# Patient Record
Sex: FEMALE | Race: WHITE | NOT HISPANIC OR LATINO | Employment: OTHER | URBAN - METROPOLITAN AREA
[De-identification: names, ages, dates, MRNs, and addresses within clinical notes are randomized per-mention and may not be internally consistent; named-entity substitution may affect disease eponyms.]

---

## 2017-01-30 ENCOUNTER — GENERIC CONVERSION - ENCOUNTER (OUTPATIENT)
Dept: OTHER | Facility: OTHER | Age: 57
End: 2017-01-30

## 2017-02-07 ENCOUNTER — GENERIC CONVERSION - ENCOUNTER (OUTPATIENT)
Dept: OTHER | Facility: OTHER | Age: 57
End: 2017-02-07

## 2017-03-24 ENCOUNTER — ALLSCRIPTS OFFICE VISIT (OUTPATIENT)
Dept: OTHER | Facility: OTHER | Age: 57
End: 2017-03-24

## 2017-09-25 ENCOUNTER — ALLSCRIPTS OFFICE VISIT (OUTPATIENT)
Dept: OTHER | Facility: OTHER | Age: 57
End: 2017-09-25

## 2017-09-25 ENCOUNTER — LAB REQUISITION (OUTPATIENT)
Dept: LAB | Facility: HOSPITAL | Age: 57
End: 2017-09-25
Payer: COMMERCIAL

## 2017-09-25 DIAGNOSIS — Z01.419 ENCOUNTER FOR GYNECOLOGICAL EXAMINATION WITHOUT ABNORMAL FINDING: ICD-10-CM

## 2017-09-25 PROCEDURE — G0145 SCR C/V CYTO,THINLAYER,RESCR: HCPCS | Performed by: NURSE PRACTITIONER

## 2017-09-25 PROCEDURE — 87624 HPV HI-RISK TYP POOLED RSLT: CPT | Performed by: NURSE PRACTITIONER

## 2017-09-29 LAB — HPV RRNA GENITAL QL NAA+PROBE: NORMAL

## 2017-10-07 LAB
LAB AP GYN PRIMARY INTERPRETATION: NORMAL
Lab: NORMAL

## 2017-10-18 ENCOUNTER — GENERIC CONVERSION - ENCOUNTER (OUTPATIENT)
Dept: OTHER | Facility: OTHER | Age: 57
End: 2017-10-18

## 2018-01-13 VITALS
BODY MASS INDEX: 31.41 KG/M2 | WEIGHT: 177.25 LBS | DIASTOLIC BLOOD PRESSURE: 78 MMHG | SYSTOLIC BLOOD PRESSURE: 120 MMHG | HEIGHT: 63 IN

## 2018-01-14 NOTE — PROGRESS NOTES
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)   5  Osteopenia (733 90) (M85 80)   6  Osteoporosis (733 00) (M81 0)   7  Vitamin D deficiency (268 9) (E55 9)    Current Meds   1  Levothyroxine Sodium 25 MCG Oral Tablet; TABS PO X 30; Therapy: 70UAN3332 to (Last Rx:12Mar2011)  Requested for: 27Pxh4938 Ordered    Allergies    1   No Known Drug Allergies    Future Appointments    Date/Time Provider Specialty Site   09/29/2017 09:00 AM Advanced GYN, Nurse Schedule  [facility] [address] [place]     Signatures   Electronically signed by : Willem Rolle, ; Mar 24 2017 10:26AM EST                       (Author)    Electronically signed by : Annie Kessler; Mar 24 2017  1:41PM EST                       (Author)    Electronically signed by : Vandana Landrum DO; Mar 28 2017 11:05AM EST

## 2018-01-16 NOTE — RESULT NOTES
Verified Results  (1) THIN PREP PAP WITH IMAGING [id number] 89:92XL Demetrice Orta Order Number: [id number]     Test Name Result Flag Reference   LAB AP CASE REPORT (Report)     Gynecologic Cytology Report            Case: [id number]                  Authorizing Provider: ADELINA Sena    Collected:      09/25/2017 1446        First Screen:     MELISSA Hardy   Received:      09/27/2017 0800        Specimen:  LIQUID-BASED PAP, SCREENING, Cervix   HPV HIGH RISK RESULT (Report)     HPV, High Risk: HPV NEG, HPV16 NEG, HPV18 NEG      Other High Risk HPV Negative, HPV 16 Negative, HPV 18 Negative  HPV types: 16,18,31,33,35,39,45,51,52,56,58,59,66 and 68 DNA are undetectable or below the pre-set threshold  [employer] FDA approved Ronald 4800 is utilized with strict adherence to the manufacturers instruction  manual to test for the presence of High-Risk HPV DNA, as well as HPV 16 and HPV 18  This instrument  has been validated by our laboratory and/or by the   A negative result does not preclude the presence of HPV infection because results depend on adequate  specimen collection, absence of inhibitors and sufficient DNA to be detected  Additionally, HPV negative  results are not intended to prevent women from proceeding to colposcopy if clinically warranted  Positive HPV test results indicate the presence of any one or more of the high risk types, but since patients  are often co-infected with low-risk types it does not rule out the presence of low-risk types in patients  with mixed infections  LAB AP GYN PRIMARY INTERPRETATION      Negative for intraepithelial lesion or malignancy  Electronically signed by MELISSA Hardy on 10/7/2017 at 11:05 AM   LAB AP GYN SPECIMEN ADEQUACY      Satisfactory for evaluation  Endocervical/transformation zone component present     LAB AP GYN ADDITIONAL INFORMATION (Report)     [employer] FDA approved ,  and ThinPrep Imaging System are   utilized with strict adherence to the 's instruction manual to   prepare gynecologic and non-gynecologic cytology specimens for the   production of ThinPrep slides as well as for gynecologic ThinPrep imaging  These processes have been validated by our laboratory and/or by the     The Pap test is not a diagnostic procedure and should not be used as the   sole means to detect cervical cancer  It is only a screening procedure to   aid in the detection of cervical cancer and its precursors  Both   false-negative and false-positive results have been experienced  Your   patient's test result should be interpreted in this context together with   the history and clinical findings

## 2018-01-17 NOTE — PROGRESS NOTES
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)   5  Osteopenia (733 90) (M85 80)   6  Osteoporosis (733 00) (M81 0)   7  Vitamin D deficiency (268 9) (E55 9)    Current Meds   1  Levothyroxine Sodium 25 MCG Oral Tablet; TABS PO X 30; Therapy: 41QOL9277 to (Last Rx:12Mar2011)  Requested for: 79Kpg6663 Ordered   2  Raloxifene HCl - 60 MG Oral Tablet; TAKE 1 TABLET DAILY AS DIRECTED; Therapy: 48Icw4066 to (Evaluate:40Sjd3304)  Requested for: 72Afd4282; Last   Rx:[id number] Ordered   3  Raloxifene HCl - 60 MG Oral Tablet; TAKE 1 TABLET DAILY AS DIRECTED; Therapy: 33KTX7195 to (Evaluate:94Scr9977)  Requested for: [id number]; Last   Rx:75Tst0303 Ordered    Allergies    1   No Known Drug Allergies    Vitals  Signs    Systolic: 144  Diastolic: 78  Height: 5 ft 3 in  Weight: 168 lb   BMI Calculated: 29 76  BSA Calculated: 1 8    Plan  Osteoporosis    · Prolia 60 MG/ML Subcutaneous Solution    Signatures   Electronically signed by : Alvarez Victor, ; Sep  9 2016 11:25AM EST                       (Author)    Electronically signed by : Kush Pride; Sep  9 2016 12:09PM EST                       (Author)    Electronically signed by : Peter Lomas DO; Sep 10 2016 10:30AM EST

## 2018-01-31 DIAGNOSIS — M81.0 OSTEOPOROSIS, UNSPECIFIED OSTEOPOROSIS TYPE, UNSPECIFIED PATHOLOGICAL FRACTURE PRESENCE: Primary | ICD-10-CM

## 2018-03-28 ENCOUNTER — CLINICAL SUPPORT (OUTPATIENT)
Dept: GYNECOLOGY | Facility: CLINIC | Age: 58
End: 2018-03-28
Payer: COMMERCIAL

## 2018-03-28 VITALS
WEIGHT: 177 LBS | HEIGHT: 63 IN | BODY MASS INDEX: 31.36 KG/M2 | DIASTOLIC BLOOD PRESSURE: 78 MMHG | SYSTOLIC BLOOD PRESSURE: 120 MMHG

## 2018-03-28 DIAGNOSIS — M81.8 OTHER OSTEOPOROSIS WITHOUT CURRENT PATHOLOGICAL FRACTURE: Primary | ICD-10-CM

## 2018-03-28 PROCEDURE — 96372 THER/PROPH/DIAG INJ SC/IM: CPT

## 2018-09-13 DIAGNOSIS — M81.0 OSTEOPOROSIS, UNSPECIFIED OSTEOPOROSIS TYPE, UNSPECIFIED PATHOLOGICAL FRACTURE PRESENCE: ICD-10-CM

## 2018-09-18 DIAGNOSIS — M81.0 OSTEOPOROSIS, UNSPECIFIED OSTEOPOROSIS TYPE, UNSPECIFIED PATHOLOGICAL FRACTURE PRESENCE: ICD-10-CM

## 2018-10-02 DIAGNOSIS — M81.0 OSTEOPOROSIS, UNSPECIFIED OSTEOPOROSIS TYPE, UNSPECIFIED PATHOLOGICAL FRACTURE PRESENCE: ICD-10-CM

## 2018-10-03 ENCOUNTER — ANNUAL EXAM (OUTPATIENT)
Dept: GYNECOLOGY | Facility: CLINIC | Age: 58
End: 2018-10-03
Payer: COMMERCIAL

## 2018-10-03 VITALS
BODY MASS INDEX: 33.09 KG/M2 | SYSTOLIC BLOOD PRESSURE: 150 MMHG | DIASTOLIC BLOOD PRESSURE: 110 MMHG | WEIGHT: 179.8 LBS | HEIGHT: 62 IN

## 2018-10-03 DIAGNOSIS — Z01.419 ENCOUNTER FOR ANNUAL ROUTINE GYNECOLOGICAL EXAMINATION: Primary | ICD-10-CM

## 2018-10-03 DIAGNOSIS — Z12.31 ENCOUNTER FOR SCREENING MAMMOGRAM FOR MALIGNANT NEOPLASM OF BREAST: ICD-10-CM

## 2018-10-03 DIAGNOSIS — M81.0 OSTEOPOROSIS, UNSPECIFIED OSTEOPOROSIS TYPE, UNSPECIFIED PATHOLOGICAL FRACTURE PRESENCE: ICD-10-CM

## 2018-10-03 DIAGNOSIS — M85.80 OSTEOPENIA, UNSPECIFIED LOCATION: ICD-10-CM

## 2018-10-03 PROCEDURE — S0612 ANNUAL GYNECOLOGICAL EXAMINA: HCPCS | Performed by: NURSE PRACTITIONER

## 2018-10-03 PROCEDURE — 96372 THER/PROPH/DIAG INJ SC/IM: CPT | Performed by: NURSE PRACTITIONER

## 2018-10-03 RX ORDER — CYANOCOBALAMIN (VITAMIN B-12) 500 MCG
LOZENGE ORAL
COMMUNITY
Start: 2013-09-04 | End: 2021-03-08 | Stop reason: ALTCHOICE

## 2018-10-03 RX ORDER — ASCORBATE CALCIUM 500 MG
1000 TABLET ORAL
COMMUNITY
Start: 2012-05-30

## 2018-10-03 RX ORDER — LEVOTHYROXINE SODIUM 0.03 MG/1
TABLET ORAL
COMMUNITY
Start: 2018-09-07

## 2018-10-03 NOTE — PROGRESS NOTES
Pt  tolerated injection well given in right arm    Gordon 47 [id number]  LOT [id number]  EXP 11/2020  INJECTION SHIPPED HERE

## 2018-10-10 DIAGNOSIS — M81.0 OSTEOPOROSIS, UNSPECIFIED OSTEOPOROSIS TYPE, UNSPECIFIED PATHOLOGICAL FRACTURE PRESENCE: ICD-10-CM

## 2019-02-07 ENCOUNTER — TELEPHONE (OUTPATIENT)
Dept: GYNECOLOGY | Facility: CLINIC | Age: 59
End: 2019-02-07

## 2019-02-07 DIAGNOSIS — Z78.0 MENOPAUSE: Primary | ICD-10-CM

## 2019-02-07 DIAGNOSIS — M89.9 BONE DISORDER: ICD-10-CM

## 2019-03-01 ENCOUNTER — DOCUMENTATION (OUTPATIENT)
Dept: GYNECOLOGY | Facility: CLINIC | Age: 59
End: 2019-03-01

## 2019-03-01 DIAGNOSIS — M81.0 OSTEOPOROSIS, UNSPECIFIED OSTEOPOROSIS TYPE, UNSPECIFIED PATHOLOGICAL FRACTURE PRESENCE: ICD-10-CM

## 2019-03-01 NOTE — PROGRESS NOTES
Results of Dexa have been reviewed by Dr Lizbeth Chaves much improved no robert for Prolia left message on pt cell phone to call back appt was cancelled for 4/4 2019

## 2019-03-14 DIAGNOSIS — Z78.0 MENOPAUSE: ICD-10-CM

## 2019-03-14 DIAGNOSIS — M89.9 BONE DISORDER: ICD-10-CM

## 2019-10-10 NOTE — PROGRESS NOTES
Assessment/Plan:    Discussed vaginal atrophy and the risks and benefits of starting vaginal estrogen cream   Patient has agreed to start and will use 1/2 gm 2x per week  Recommended monthly SBE, annual CBE and annual screening mammo  ASCCP guidelines reviewed and pap with cotesting noted to be up to date; this low risk patient was advised she meets criteria to d/c pap screening at age 73  DEXA and colonoscopy noted to be up to date  The patient denies STI risk factors and declines testing at this time  Reviewed diet/activity recommendations Calcium 2083-2291 mg and Vit D 600-1000 IU daily  Discussed postmenopausal considerations and symptoms to report  Kegel exercises as instructed  RTO in one year for routine annual gyn exam or sooner PRN  Diagnoses and all orders for this visit:    Encounter for gynecological examination (general) (routine) without abnormal findings    Vaginal atrophy  -     estradiol (ESTRACE) 0 1 mg/g vaginal cream; Insert 0 5 g into the vagina 2 (two) times a week        Subjective:      Patient ID: Kandi Valencia is a 63 y o  female  This patient presents for routine annual gyn exam  S/P LSH  BSO for fibroids  Has vaginal dryness and decreased libido  , monogamous 33 years  She denies  bleeding or spotting, VM sx, pelvic pain, breast concerns, abnormal discharge, bowel/bladder dysfunction  Denies STI concerns  No hx of STIs  Pap/HPV up to date and normal, 9/25/17, due 2020  Mammography up to date and normal, 2/22/19  Osteoporosis screening up to date 3/2019, Prolia was stopped secondary to improvement in DXA  Colonoscopy up to date and normal done in 2018, due in 5 years  Patient has had negative genetic testing done  She is a therapist in private practice           The following portions of the patient's history were reviewed and updated as appropriate: allergies, current medications, past family history, past medical history, past social history, past surgical history and problem list     Review of Systems   Constitutional: Negative  Respiratory: Negative  Cardiovascular: Negative  Gastrointestinal: Negative  Endocrine: Negative  Genitourinary: Negative for dyspareunia, dysuria, frequency, pelvic pain, urgency, vaginal bleeding, vaginal discharge and vaginal pain  Musculoskeletal: Negative  Skin: Negative  Neurological: Negative  Psychiatric/Behavioral: Negative  Objective:      /82   Pulse (!) 123   Ht 5' 3" (1 6 m)   Wt 84 4 kg (186 lb)   BMI 32 95 kg/m²          Physical Exam   Constitutional: She is oriented to person, place, and time  She appears well-developed and well-nourished  She is cooperative  HENT:   Head: Normocephalic and atraumatic  Neck: Normal range of motion  Neck supple  No thyroid mass and no thyromegaly present  Cardiovascular: Normal rate, regular rhythm and normal heart sounds  Pulmonary/Chest: Effort normal and breath sounds normal  Right breast exhibits no inverted nipple, no mass, no nipple discharge, no skin change and no tenderness  Left breast exhibits no inverted nipple, no mass, no nipple discharge, no skin change and no tenderness  No breast tenderness or discharge  Breasts are symmetrical    Abdominal: Soft  Normal appearance and bowel sounds are normal  There is no hepatosplenomegaly  There is no tenderness  Hernia confirmed negative in the right inguinal area and confirmed negative in the left inguinal area  Genitourinary: Vagina normal  Rectal exam shows no external hemorrhoid  No breast tenderness or discharge  Pelvic exam was performed with patient supine  No labial fusion  There is no rash, tenderness, lesion or injury on the right labia  There is no rash, tenderness, lesion or injury on the left labia  Cervix exhibits no motion tenderness, no discharge and no friability  Right adnexum displays no mass, no tenderness and no fullness   Left adnexum displays no mass, no tenderness and no fullness  No erythema, tenderness or bleeding in the vagina  No signs of injury around the vagina  No vaginal discharge found  Genitourinary Comments: Urethra normal without lesions  No bladder tenderness  Moderate VVA  Uterus absent, no masses or tenderness on BME   Musculoskeletal: Normal range of motion  Lymphadenopathy:     She has no axillary adenopathy  No inguinal adenopathy noted on the right or left side  Right: No inguinal adenopathy present  Left: No inguinal adenopathy present  Neurological: She is alert and oriented to person, place, and time  Skin: Skin is warm, dry and intact  Psychiatric: She has a normal mood and affect  Her speech is normal and behavior is normal  Cognition and memory are normal    Nursing note and vitals reviewed

## 2019-10-11 ENCOUNTER — ANNUAL EXAM (OUTPATIENT)
Dept: GYNECOLOGY | Facility: CLINIC | Age: 59
End: 2019-10-11
Payer: COMMERCIAL

## 2019-10-11 VITALS
BODY MASS INDEX: 32.96 KG/M2 | SYSTOLIC BLOOD PRESSURE: 124 MMHG | WEIGHT: 186 LBS | DIASTOLIC BLOOD PRESSURE: 82 MMHG | HEIGHT: 63 IN | HEART RATE: 123 BPM

## 2019-10-11 DIAGNOSIS — Z01.419 ENCOUNTER FOR GYNECOLOGICAL EXAMINATION (GENERAL) (ROUTINE) WITHOUT ABNORMAL FINDINGS: Primary | ICD-10-CM

## 2019-10-11 DIAGNOSIS — N95.2 VAGINAL ATROPHY: ICD-10-CM

## 2019-10-11 PROCEDURE — S0612 ANNUAL GYNECOLOGICAL EXAMINA: HCPCS | Performed by: OBSTETRICS & GYNECOLOGY

## 2019-10-11 RX ORDER — ESTRADIOL 0.1 MG/G
0.5 CREAM VAGINAL 2 TIMES WEEKLY
Qty: 42.5 G | Refills: 14 | Status: SHIPPED | OUTPATIENT
Start: 2019-10-14 | End: 2021-03-08 | Stop reason: ALTCHOICE

## 2019-10-11 NOTE — PATIENT INSTRUCTIONS
Discussed vaginal atrophy and the risks and benefits of starting vaginal estrogen cream   Patient has agreed to start and will use 1/2 gm 2x per week  Recommended monthly SBE, annual CBE and annual screening mammo  ASCCP guidelines reviewed and pap with cotesting noted to be up to date; this low risk patient was advised she meets criteria to d/c pap screening at age 73  DEXA and colonoscopy noted to be up to date  The patient denies STI risk factors and declines testing at this time  Reviewed diet/activity recommendations Calcium 6846-2248 mg and Vit D 600-1000 IU daily  Discussed postmenopausal considerations and symptoms to report  Kegel exercises as instructed  RTO in one year for routine annual gyn exam or sooner PRN

## 2021-03-08 ENCOUNTER — ANNUAL EXAM (OUTPATIENT)
Dept: GYNECOLOGY | Facility: CLINIC | Age: 61
End: 2021-03-08
Payer: COMMERCIAL

## 2021-03-08 VITALS
HEART RATE: 94 BPM | BODY MASS INDEX: 32.43 KG/M2 | HEIGHT: 63 IN | SYSTOLIC BLOOD PRESSURE: 120 MMHG | WEIGHT: 183 LBS | DIASTOLIC BLOOD PRESSURE: 74 MMHG

## 2021-03-08 DIAGNOSIS — Z90.711 S/P LAPAROSCOPIC SUPRACERVICAL HYSTERECTOMY: ICD-10-CM

## 2021-03-08 DIAGNOSIS — Z01.419 ENCOUNTER FOR GYNECOLOGICAL EXAMINATION WITH PAPANICOLAOU SMEAR OF CERVIX: ICD-10-CM

## 2021-03-08 DIAGNOSIS — Z01.419 ENCOUNTER FOR GYNECOLOGICAL EXAMINATION (GENERAL) (ROUTINE) WITHOUT ABNORMAL FINDINGS: Primary | ICD-10-CM

## 2021-03-08 DIAGNOSIS — N95.2 VAGINAL ATROPHY: ICD-10-CM

## 2021-03-08 DIAGNOSIS — Z01.419 ENCOUNTER FOR GYNECOLOGICAL EXAMINATION WITH PAPANICOLAOU SMEAR OF CERVIX: Primary | ICD-10-CM

## 2021-03-08 DIAGNOSIS — Z87.39 HISTORY OF OSTEOPOROSIS: ICD-10-CM

## 2021-03-08 PROCEDURE — 99396 PREV VISIT EST AGE 40-64: CPT | Performed by: OBSTETRICS & GYNECOLOGY

## 2021-03-08 RX ORDER — ESTRADIOL 0.1 MG/G
0.5 CREAM VAGINAL 2 TIMES WEEKLY
Qty: 42.5 G | Refills: 1 | Status: SHIPPED | OUTPATIENT
Start: 2021-03-08 | End: 2021-03-08

## 2021-03-08 RX ORDER — ESTRADIOL 0.1 MG/G
0.5 CREAM VAGINAL 2 TIMES WEEKLY
Qty: 42.5 G | Refills: 1 | Status: SHIPPED | OUTPATIENT
Start: 2021-03-08

## 2021-03-10 LAB
CYTOLOGIST CVX/VAG CYTO: NORMAL
DX ICD CODE: NORMAL
OTHER STN SPEC: NORMAL
PATH REPORT.FINAL DX SPEC: NORMAL
SL AMB NOTE:: NORMAL
SL AMB SPECIMEN ADEQUACY: NORMAL
SL AMB TEST METHODOLOGY: NORMAL

## 2021-03-30 ENCOUNTER — TELEPHONE (OUTPATIENT)
Dept: GYNECOLOGY | Facility: CLINIC | Age: 61
End: 2021-03-30

## 2021-03-30 NOTE — TELEPHONE ENCOUNTER
----- Message from [id number]  [facility] sent at 3/30/2021 12:13 PM EDT -----  This is still not resulted  Can you double check     [facility] and also sent the paper back verifying the add on of HPV on 03/30/21 as well as initially speaking with them

## 2021-04-07 LAB — HPV I/H RISK 4 DNA CVX QL PROBE+SIG AMP: NEGATIVE

## 2021-08-27 ENCOUNTER — TELEPHONE (OUTPATIENT)
Dept: GYNECOLOGY | Facility: CLINIC | Age: 61
End: 2021-08-27

## 2021-08-27 NOTE — TELEPHONE ENCOUNTER
Ellyn called with Code # [id number] so that you can retreive her Dexa from [facility]  Not sure if you know how

## 2021-08-30 NOTE — TELEPHONE ENCOUNTER
I do not  The results are not on care everywhere  Please call them and have results faxed to our office

## 2021-09-13 ENCOUNTER — TELEPHONE (OUTPATIENT)
Dept: GYNECOLOGY | Facility: CLINIC | Age: 61
End: 2021-09-13

## 2021-09-13 NOTE — TELEPHONE ENCOUNTER
Patient made aware of osteoporosis on DXA and will restart Prolia  Message sent to pool to check ins coverage and call patient with results

## 2021-10-26 ENCOUNTER — DOCUMENTATION (OUTPATIENT)
Dept: GYNECOLOGY | Facility: CLINIC | Age: 61
End: 2021-10-26

## 2021-10-27 ENCOUNTER — DOCUMENTATION (OUTPATIENT)
Dept: GYNECOLOGY | Facility: CLINIC | Age: 61
End: 2021-10-27

## 2021-10-27 ENCOUNTER — TELEPHONE (OUTPATIENT)
Dept: GYNECOLOGY | Facility: CLINIC | Age: 61
End: 2021-10-27

## 2021-11-23 ENCOUNTER — CLINICAL SUPPORT (OUTPATIENT)
Dept: GYNECOLOGY | Facility: CLINIC | Age: 61
End: 2021-11-23
Payer: COMMERCIAL

## 2021-11-23 DIAGNOSIS — M81.0 OSTEOPOROSIS, UNSPECIFIED OSTEOPOROSIS TYPE, UNSPECIFIED PATHOLOGICAL FRACTURE PRESENCE: Primary | ICD-10-CM

## 2021-11-23 PROCEDURE — 96372 THER/PROPH/DIAG INJ SC/IM: CPT | Performed by: OBSTETRICS & GYNECOLOGY

## 2022-03-16 NOTE — PROGRESS NOTES
Assessment/Plan:    Will continue follow up with breast care team  ASCCP guidelines reviewed and pap with cotesting noted to be up to date; this low risk patient was advised she meets criteria to d/c pap screening at age 73  No pap done today  DEXA and colonoscopy noted to be up to date  Reviewed diet/activity recommendations Calcium 7012-2133 mg and Vit D 600-1000 IU daily  Discussed postmenopausal considerations and symptoms to report  Kegel exercises as instructed  RTO in one year for routine annual gyn exam or sooner PRN  Diagnoses and all orders for this visit:    Encounter for gynecological examination (general) (routine) without abnormal findings        Subjective:      Patient ID: Miguel Ángel Muñoz is a 62 y o  female  This patient presents for routine annual gyn exam   Has a hx of LSH/BSO for fibroids  She follows with breast specialist at [facility] for hx of intraductal papilloma of right breast  Benign breast MRI 3/3/22  Her family history is positive for breast cancer in her mother who was diagnosed at the age of 48, then she was diagnosed with endometrial cancer at age 64, colon cancer at age 62 and lymphoma between the ages of 64-70  Per the patient her mother tested negative for the BRCA and colon mutations  Two maternal uncles along with both of her grandfathers had prostate cancer  The patient's [employer] testing is negative  She is on Prolia for osteoporosis  She denies vaginal bleeding or spotting, VM sx, pelvic pain, dyspareunia, breast concerns, abnormal discharge, bowel/bladder dysfunction, depression/anx   35 years, rarely sexually active as her  now has back issues  Pap/HPV up to date and normal, 3/8/21  Osteoporosis screening up to date, 8/23/21, osteoporosis  Colonoscopy up to date, done in 2018 with Martha Norton        The following portions of the patient's history were reviewed and updated as appropriate: allergies, current medications, past family history, past medical history, past social history, past surgical history and problem list     Review of Systems   Constitutional: Negative  Respiratory: Negative  Cardiovascular: Negative  Gastrointestinal: Negative  Endocrine: Negative  Genitourinary: Negative for dyspareunia, dysuria, frequency, pelvic pain, urgency, vaginal bleeding, vaginal discharge and vaginal pain  Musculoskeletal: Negative  Skin: Negative  Neurological: Negative  Psychiatric/Behavioral: Negative  Objective:      /72   Ht 5' 2" (1 575 m)   Wt 85 3 kg (188 lb)   BMI 34 39 kg/m²          Physical Exam  Vitals and nursing note reviewed  Exam conducted with a chaperone present  Constitutional:       Appearance: Normal appearance  She is well-developed  HENT:      Head: Normocephalic and atraumatic  Neck:      Thyroid: No thyroid mass or thyromegaly  Cardiovascular:      Rate and Rhythm: Normal rate and regular rhythm  Heart sounds: Normal heart sounds  Pulmonary:      Effort: Pulmonary effort is normal       Breath sounds: Normal breath sounds  Chest:   Breasts: Breasts are symmetrical       Right: No inverted nipple, mass, nipple discharge, skin change or tenderness  Left: No inverted nipple, mass, nipple discharge, skin change or tenderness  Abdominal:      General: Bowel sounds are normal       Palpations: Abdomen is soft  Tenderness: There is no abdominal tenderness  Hernia: There is no hernia in the left inguinal area or right inguinal area  Genitourinary:     General: Normal vulva  Exam position: Supine  Pubic Area: No rash  Labia:         Right: No rash, tenderness, lesion or injury  Left: No rash, tenderness, lesion or injury  Urethra: No prolapse, urethral pain, urethral swelling or urethral lesion  Vagina: Normal  No signs of injury and foreign body   No vaginal discharge, erythema, tenderness, bleeding, lesions or prolapsed vaginal walls  Cervix: No cervical motion tenderness, discharge, friability, lesion, erythema, cervical bleeding or eversion  Adnexa:         Right: No mass, tenderness or fullness  Left: No mass, tenderness or fullness  Rectum: No external hemorrhoid  Comments: Urethra normal without lesions  No bladder tenderness  Uterus absent, no masses or tenderness on BME, exam limited by body habitus  Musculoskeletal:         General: Normal range of motion  Cervical back: Normal range of motion and neck supple  Lymphadenopathy:      Lower Body: No right inguinal adenopathy  No left inguinal adenopathy  Skin:     General: Skin is warm and dry  Neurological:      Mental Status: She is alert and oriented to person, place, and time  Psychiatric:         Speech: Speech normal          Behavior: Behavior normal  Behavior is cooperative

## 2022-03-17 ENCOUNTER — ANNUAL EXAM (OUTPATIENT)
Dept: GYNECOLOGY | Facility: CLINIC | Age: 62
End: 2022-03-17
Payer: COMMERCIAL

## 2022-03-17 VITALS
DIASTOLIC BLOOD PRESSURE: 72 MMHG | SYSTOLIC BLOOD PRESSURE: 152 MMHG | BODY MASS INDEX: 34.6 KG/M2 | HEIGHT: 62 IN | WEIGHT: 188 LBS

## 2022-03-17 DIAGNOSIS — Z01.419 ENCOUNTER FOR GYNECOLOGICAL EXAMINATION (GENERAL) (ROUTINE) WITHOUT ABNORMAL FINDINGS: Primary | ICD-10-CM

## 2022-03-17 PROCEDURE — 99396 PREV VISIT EST AGE 40-64: CPT | Performed by: OBSTETRICS & GYNECOLOGY

## 2022-05-24 ENCOUNTER — OFFICE VISIT (OUTPATIENT)
Dept: GYNECOLOGY | Facility: CLINIC | Age: 62
End: 2022-05-24
Payer: COMMERCIAL

## 2022-05-24 VITALS
SYSTOLIC BLOOD PRESSURE: 140 MMHG | WEIGHT: 189 LBS | HEART RATE: 94 BPM | DIASTOLIC BLOOD PRESSURE: 92 MMHG | HEIGHT: 62 IN | BODY MASS INDEX: 34.78 KG/M2

## 2022-05-24 DIAGNOSIS — M81.0 OSTEOPOROSIS, UNSPECIFIED OSTEOPOROSIS TYPE, UNSPECIFIED PATHOLOGICAL FRACTURE PRESENCE: Primary | ICD-10-CM

## 2022-05-24 PROCEDURE — 96372 THER/PROPH/DIAG INJ SC/IM: CPT

## 2022-05-24 NOTE — PROGRESS NOTES
[facility] site, lot  [id number], exp  05/2024,   Gordon  [phone], Bill both admin and medication

## 2022-11-10 ENCOUNTER — DOCUMENTATION (OUTPATIENT)
Dept: GYNECOLOGY | Facility: CLINIC | Age: 62
End: 2022-11-10

## 2022-11-29 ENCOUNTER — CLINICAL SUPPORT (OUTPATIENT)
Dept: GYNECOLOGY | Facility: CLINIC | Age: 62
End: 2022-11-29

## 2022-11-29 DIAGNOSIS — M81.0 OSTEOPOROSIS, UNSPECIFIED OSTEOPOROSIS TYPE, UNSPECIFIED PATHOLOGICAL FRACTURE PRESENCE: Primary | ICD-10-CM

## 2022-11-29 NOTE — PROGRESS NOTES
Pt  Tolerated injection well give in the left upper arm  NDC 66933-216-58  LOT [id number]  EXP 3/31/2024  Bill for both

## 2023-05-24 NOTE — PROGRESS NOTES
Assessment/Plan:    Will continue with breast care team   ASCCP guidelines reviewed and pap with cotesting noted to be up to date; this low risk patient was advised she meets criteria to d/c pap screening at age 73  DEXA script given and colonoscopy noted to be up to date  Reviewed diet/activity recommendations Calcium 1200 mg and Vit D 600-1000 IU daily  Discussed postmenopausal considerations and symptoms to report  Kegel exercises as instructed  RTO in one year for routine annual gyn exam or sooner PRN  Diagnoses and all orders for this visit:    Encounter for gynecological examination (general) (routine) without abnormal findings    Menopause  -     DXA bone density spine hip and pelvis; Future        Subjective:      Patient ID: Nathen Tavarez is a 59 y o  female  This patient presents for routine annual gyn exam   Has a hx of LSH/BSO for fibroids  She follows with breast specialist at [facility] for hx of intraductal papilloma of right breast  Benign breast MRI 3/3/22  Mammo benign 8/31/22  Her family history is positive for breast cancer in her mother who was diagnosed at the age of 48, then she was diagnosed with endometrial cancer at age 64, colon cancer at age 62 and lymphoma between the ages of 64-70  Per the patient her mother tested negative for the BRCA and colon mutations  Two maternal uncles along with both of her grandfathers had prostate cancer  The patient's [employer] testing is negative  She is on Prolia for osteoporosis  Prolia given today  She denies vaginal bleeding or spotting, VM sx, pelvic pain, dyspareunia, breast concerns, abnormal discharge, bowel/bladder dysfunction, depression/anx   37 years, rarely sexually active as her  now has back issues  Pap/HPV up to date and normal, 3/8/21  Osteoporosis screening up to date, 8/23/21, osteoporosis  Colonoscopy up to date, done in 2018 with Jacey Edwards        The following portions of the patient's history were reviewed "and updated as appropriate: allergies, current medications, past family history, past medical history, past social history, past surgical history and problem list     Review of Systems   Constitutional: Negative  Respiratory: Negative  Cardiovascular: Negative  Gastrointestinal: Negative  Endocrine: Negative  Genitourinary: Negative for dyspareunia, dysuria, frequency, pelvic pain, urgency, vaginal bleeding, vaginal discharge and vaginal pain  Musculoskeletal: Negative  Skin: Negative  Neurological: Negative  Psychiatric/Behavioral: Negative  Objective:      /70   Pulse 100   Ht 5' 2\" (1 575 m)   Wt 84 8 kg (187 lb)   BMI 34 20 kg/m²          Physical Exam  Vitals and nursing note reviewed  Exam conducted with a chaperone present  Constitutional:       Appearance: Normal appearance  She is well-developed  HENT:      Head: Normocephalic and atraumatic  Neck:      Thyroid: No thyroid mass or thyromegaly  Cardiovascular:      Rate and Rhythm: Normal rate and regular rhythm  Heart sounds: Normal heart sounds  Pulmonary:      Effort: Pulmonary effort is normal       Breath sounds: Normal breath sounds  Chest:   Breasts:     Breasts are symmetrical       Right: No inverted nipple, mass, nipple discharge, skin change or tenderness  Left: No inverted nipple, mass, nipple discharge, skin change or tenderness  Abdominal:      General: Bowel sounds are normal       Palpations: Abdomen is soft  Tenderness: There is no abdominal tenderness  Hernia: There is no hernia in the left inguinal area or right inguinal area  Genitourinary:     General: Normal vulva  Exam position: Supine  Pubic Area: No rash  Labia:         Right: No rash, tenderness, lesion or injury  Left: No rash, tenderness, lesion or injury  Urethra: No prolapse, urethral pain, urethral swelling or urethral lesion        Vagina: Normal  No signs of injury " and foreign body  No vaginal discharge, erythema, tenderness, bleeding, lesions or prolapsed vaginal walls  Cervix: No cervical motion tenderness, discharge, friability, lesion, erythema, cervical bleeding or eversion  Adnexa:         Right: No mass, tenderness or fullness  Left: No mass, tenderness or fullness  Rectum: No external hemorrhoid  Comments: Urethra normal without lesions  No bladder tenderness  Uterus absent, no masses or tenderness on BME, exam limited by body habitus  Musculoskeletal:         General: Normal range of motion  Cervical back: Normal range of motion and neck supple  Lymphadenopathy:      Lower Body: No right inguinal adenopathy  No left inguinal adenopathy  Skin:     General: Skin is warm and dry  Neurological:      Mental Status: She is alert and oriented to person, place, and time  Psychiatric:         Speech: Speech normal          Behavior: Behavior normal  Behavior is cooperative

## 2023-05-30 ENCOUNTER — ANNUAL EXAM (OUTPATIENT)
Dept: GYNECOLOGY | Facility: CLINIC | Age: 63
End: 2023-05-30

## 2023-05-30 VITALS
BODY MASS INDEX: 34.41 KG/M2 | HEIGHT: 62 IN | DIASTOLIC BLOOD PRESSURE: 70 MMHG | WEIGHT: 187 LBS | SYSTOLIC BLOOD PRESSURE: 124 MMHG | HEART RATE: 100 BPM

## 2023-05-30 DIAGNOSIS — M81.0 OSTEOPOROSIS, UNSPECIFIED OSTEOPOROSIS TYPE, UNSPECIFIED PATHOLOGICAL FRACTURE PRESENCE: ICD-10-CM

## 2023-05-30 DIAGNOSIS — Z01.419 ENCOUNTER FOR GYNECOLOGICAL EXAMINATION (GENERAL) (ROUTINE) WITHOUT ABNORMAL FINDINGS: Primary | ICD-10-CM

## 2023-05-30 DIAGNOSIS — Z78.0 MENOPAUSE: ICD-10-CM

## 2023-06-28 ENCOUNTER — TELEPHONE (OUTPATIENT)
Dept: GYNECOLOGY | Facility: CLINIC | Age: 63
End: 2023-06-28

## 2023-06-28 NOTE — TELEPHONE ENCOUNTER
Patient called and states she is wondering if there is a card which covers part of prolia cost?  She states it is a discount card  Please let me know or contact patient    Can leave message

## 2023-08-23 ENCOUNTER — TELEPHONE (OUTPATIENT)
Dept: GYNECOLOGY | Facility: CLINIC | Age: 63
End: 2023-08-23

## 2023-11-16 ENCOUNTER — TELEPHONE (OUTPATIENT)
Dept: GYNECOLOGY | Facility: CLINIC | Age: 63
End: 2023-11-16

## 2023-11-30 ENCOUNTER — OFFICE VISIT (OUTPATIENT)
Dept: GYNECOLOGY | Facility: CLINIC | Age: 63
End: 2023-11-30
Payer: COMMERCIAL

## 2023-11-30 VITALS
DIASTOLIC BLOOD PRESSURE: 70 MMHG | SYSTOLIC BLOOD PRESSURE: 118 MMHG | BODY MASS INDEX: 34.41 KG/M2 | WEIGHT: 187 LBS | HEIGHT: 62 IN

## 2023-11-30 DIAGNOSIS — M81.0 AGE-RELATED OSTEOPOROSIS WITHOUT CURRENT PATHOLOGICAL FRACTURE: Primary | ICD-10-CM

## 2023-11-30 PROCEDURE — 96372 THER/PROPH/DIAG INJ SC/IM: CPT

## 2023-11-30 NOTE — PROGRESS NOTES
pt present for prolia injection. Tolerated well in right upper arm. Office supplied, [facility] Both.  Given by Merline Ohara  [address] [id number]  lot [id number]  exp 08/31/2025

## 2024-01-19 ENCOUNTER — DOCUMENTATION (OUTPATIENT)
Dept: OBGYN CLINIC | Facility: CLINIC | Age: 64
End: 2024-01-19

## 2024-02-09 ENCOUNTER — DOCUMENTATION (OUTPATIENT)
Dept: OBGYN CLINIC | Facility: CLINIC | Age: 64
End: 2024-02-09

## 2024-02-09 NOTE — PROGRESS NOTES
Prolia not covered under her pharmacy plan   will try to see if it is covered  under her medical plan .  Send via SeeOnt   It may be excempt from her plan all together may  have to use copay card.

## 2024-02-21 PROBLEM — Z01.419 ENCOUNTER FOR ANNUAL ROUTINE GYNECOLOGICAL EXAMINATION: Status: RESOLVED | Noted: 2018-10-03 | Resolved: 2024-02-21

## 2024-05-15 ENCOUNTER — TELEPHONE (OUTPATIENT)
Age: 64
End: 2024-05-15

## 2024-05-15 NOTE — TELEPHONE ENCOUNTER
Patient called and had wanted to check on how much the Prolia injection would cost since her insurance won't cover it. Patient has an appointment on 6/3 with Margaret. Patient can be reached at [phone].

## 2024-05-17 ENCOUNTER — TELEPHONE (OUTPATIENT)
Dept: GYNECOLOGY | Facility: CLINIC | Age: 64
End: 2024-05-17

## 2024-05-17 NOTE — TELEPHONE ENCOUNTER
Spoke to Tranees at [facility] [phone]. Prolia will cost patient $1,444.56. Order #[id number] will be delivered to the office Monday pending no outstanding weather conditions. Patient is scheduled for her yearly and Prolia 06/03/24.  I spoke with patient and relayed the price to her. Patient is in agreeance.     View All Conversations on this Encounter   Lorena Bryan [facility] Advanced Gyn Care Clinical2 days ago     GG  Pt would like to proceed with paying out of pocket for prolia. Her appt is on 6/3.  Please call [facility] and order 1 dose of it.  [phone]. Acct # [id number].    Please confirm the price of the prolia with them and let me know.  Pt must pay full price of prolia. Pt is aware.

## 2024-05-31 NOTE — PROGRESS NOTES
Assessment/Plan:     Recommended monthly SBE, annual CBE and annual screening mammo. ASCCP guidelines reviewed and pap with cotesting done. DEXA and colonoscopy noted to be up to date. Reviewed diet/activity recommendations Calcium 1200 mg and Vit D 600-1000 IU daily.  Discussed postmenopausal considerations and symptoms to report. Pt will reinitiate consistent use of vaginal estrogen cream 1/2 gm 2x per week. Kegel exercises as instructed. RTO in one year for routine annual gyn exam or sooner PRN.        Diagnoses and all orders for this visit:    Encounter for gynecological examination (general) (routine) without abnormal findings    Osteoporosis, unspecified osteoporosis type, unspecified pathological fracture presence  -     Ambulatory Referral to Endocrinology; Future    Menopause    Age-related osteoporosis without current pathological fracture    Encounter for Papanicolaou smear for cervical cancer screening    Other orders  -     denosumab (PROLIA) 60 mg/mL; Inject 60 mg under the skin once        Subjective:      Patient ID: Ellyn Pak is a 64 y.o. female.    This patient presents for routine annual gyn exam.  Has a hx of LSH/BSO for fibroids.  She follows with breast specialist at [facility] for hx of intraductal papilloma of right breast. Benign breast MRI 3/3/22. Mammo benign 9/2023.  Her family history is positive for breast cancer in her mother who was diagnosed at the age of 53, then she was diagnosed with endometrial cancer at age 57, colon cancer at age 60 and lymphoma between the ages of 60-70. Per the patient her mother tested negative for the BRCA and colon mutations. Two maternal uncles along with both of her grandfathers had prostate cancer. The patient's [employer] testing is negative.  She is on Prolia for osteoporosis, received today. Will continue with endocrinology. Discussed switching to oral secondary to cost but pt states she will be on Medicare soon and would like to continue Prolia.   "  She denies vaginal bleeding or spotting, VM sx, pelvic pain, dyspareunia, breast concerns, abnormal discharge, bowel/bladder dysfunction, depression/anx.    37 years, rarely sexually active as her  now has back issues. Pt has vaginal estrogen prescribed, but not using it consistently.  Pap normal, 3/8/21. Will repeat. Osteoporosis screening up to date, 11/16/23, osteopenia. Colonoscopy up to date, done last year per pt with Dr. Klein.           The following portions of the patient's history were reviewed and updated as appropriate: allergies, current medications, past family history, past medical history, past social history, past surgical history and problem list.    Review of Systems   Constitutional: Negative.    Respiratory: Negative.     Cardiovascular: Negative.    Gastrointestinal: Negative.    Endocrine: Negative.    Genitourinary:  Negative for dysuria, frequency, pelvic pain, urgency, vaginal bleeding, vaginal discharge and vaginal pain.   Musculoskeletal: Negative.    Skin: Negative.    Neurological: Negative.    Psychiatric/Behavioral: Negative.           Objective:      /70   Ht 5' 2\" (1.575 m)   Wt 87.5 kg (193 lb)   BMI 35.30 kg/m²          Physical Exam  Vitals and nursing note reviewed. Exam conducted with a chaperone present.   Constitutional:       Appearance: Normal appearance. She is well-developed.   HENT:      Head: Normocephalic and atraumatic.   Neck:      Thyroid: No thyroid mass or thyromegaly.   Cardiovascular:      Rate and Rhythm: Normal rate and regular rhythm.      Heart sounds: Normal heart sounds.   Pulmonary:      Effort: Pulmonary effort is normal.      Breath sounds: Normal breath sounds.   Chest:   Breasts:     Breasts are symmetrical.      Right: No inverted nipple, mass, nipple discharge, skin change or tenderness.      Left: No inverted nipple, mass, nipple discharge, skin change or tenderness.   Abdominal:      General: Bowel sounds are normal.      " Palpations: Abdomen is soft.      Tenderness: There is no abdominal tenderness.      Hernia: There is no hernia in the left inguinal area or right inguinal area.   Genitourinary:     General: Normal vulva.      Exam position: Supine.      Pubic Area: No rash.       Labia:         Right: No rash, tenderness, lesion or injury.         Left: No rash, tenderness, lesion or injury.       Urethra: No prolapse, urethral pain, urethral swelling or urethral lesion.      Vagina: Normal. No signs of injury and foreign body. No vaginal discharge, erythema, tenderness, bleeding, lesions or prolapsed vaginal walls.      Cervix: No cervical motion tenderness, discharge, friability, lesion, erythema, cervical bleeding or eversion.      Adnexa:         Right: No mass, tenderness or fullness.          Left: No mass, tenderness or fullness.        Rectum: No external hemorrhoid.      Comments: Urethra normal without lesions  No bladder tenderness  Uterus absent, no masses or tenderness on BME  Moderate to severe VVA  Musculoskeletal:         General: Normal range of motion.      Cervical back: Normal range of motion and neck supple.   Lymphadenopathy:      Lower Body: No right inguinal adenopathy. No left inguinal adenopathy.   Skin:     General: Skin is warm and dry.   Neurological:      Mental Status: She is alert and oriented to person, place, and time.   Psychiatric:         Speech: Speech normal.         Behavior: Behavior normal. Behavior is cooperative.

## 2024-06-03 ENCOUNTER — ANNUAL EXAM (OUTPATIENT)
Dept: GYNECOLOGY | Facility: CLINIC | Age: 64
End: 2024-06-03
Payer: COMMERCIAL

## 2024-06-03 VITALS
HEIGHT: 62 IN | DIASTOLIC BLOOD PRESSURE: 70 MMHG | WEIGHT: 193 LBS | SYSTOLIC BLOOD PRESSURE: 118 MMHG | BODY MASS INDEX: 35.51 KG/M2

## 2024-06-03 DIAGNOSIS — Z78.0 MENOPAUSE: ICD-10-CM

## 2024-06-03 DIAGNOSIS — M81.0 AGE-RELATED OSTEOPOROSIS WITHOUT CURRENT PATHOLOGICAL FRACTURE: ICD-10-CM

## 2024-06-03 DIAGNOSIS — M81.0 OSTEOPOROSIS, UNSPECIFIED OSTEOPOROSIS TYPE, UNSPECIFIED PATHOLOGICAL FRACTURE PRESENCE: ICD-10-CM

## 2024-06-03 DIAGNOSIS — Z01.419 ENCOUNTER FOR GYNECOLOGICAL EXAMINATION (GENERAL) (ROUTINE) WITHOUT ABNORMAL FINDINGS: Primary | ICD-10-CM

## 2024-06-03 DIAGNOSIS — Z12.4 ENCOUNTER FOR PAPANICOLAOU SMEAR FOR CERVICAL CANCER SCREENING: ICD-10-CM

## 2024-06-03 PROCEDURE — G0476 HPV COMBO ASSAY CA SCREEN: HCPCS | Performed by: OBSTETRICS & GYNECOLOGY

## 2024-06-03 PROCEDURE — 96372 THER/PROPH/DIAG INJ SC/IM: CPT | Performed by: OBSTETRICS & GYNECOLOGY

## 2024-06-03 PROCEDURE — 99396 PREV VISIT EST AGE 40-64: CPT | Performed by: OBSTETRICS & GYNECOLOGY

## 2024-06-03 PROCEDURE — G0145 SCR C/V CYTO,THINLAYER,RESCR: HCPCS | Performed by: OBSTETRICS & GYNECOLOGY

## 2024-06-03 NOTE — PROGRESS NOTES
pt tolerated prolia injection well in right upper arm.  medication is pt supplied, bill administration only. Given by KN  NDC 49591-976-21  Lot [id number]  Exp 08/31/2026

## 2024-06-04 LAB
HPV HR 12 DNA CVX QL NAA+PROBE: NEGATIVE
HPV16 DNA CVX QL NAA+PROBE: NEGATIVE
HPV18 DNA CVX QL NAA+PROBE: NEGATIVE

## 2024-06-06 DIAGNOSIS — N95.2 VAGINAL ATROPHY: ICD-10-CM

## 2024-06-07 ENCOUNTER — NURSE TRIAGE (OUTPATIENT)
Dept: OTHER | Facility: OTHER | Age: 64
End: 2024-06-07

## 2024-06-07 NOTE — TELEPHONE ENCOUNTER
"Reason for Disposition   [1] Caller requesting a prescription renewal (no refills left), no triage required, AND [2] triager able to renew prescription per department policy    Answer Assessment - Initial Assessment Questions  1. DRUG NAME: \"What medicine do you need to have refilled?\"      Pharmacy does not have receipt of script for estradiol vaginal cream    Protocols used: Medication Refill and Renewal Call-ADULT-    Pt advised triager will message office for follow up on Monday; pt verbalized understanding.  "

## 2024-06-07 NOTE — TELEPHONE ENCOUNTER
"Regarding: Prescription problem  ----- Message from Nivia MA sent at 6/7/2024  7:18 PM EDT -----  \"My doctor was supposed to send a prescription for estradiol (ESTRACE VAGINAL) 0.1 mg/g vaginal cream.\"    "

## 2024-06-10 DIAGNOSIS — N95.2 VAGINAL ATROPHY: ICD-10-CM

## 2024-06-10 LAB
LAB AP GYN PRIMARY INTERPRETATION: NORMAL
Lab: NORMAL

## 2024-06-10 RX ORDER — ESTRADIOL 0.1 MG/G
0.5 CREAM VAGINAL 2 TIMES WEEKLY
Qty: 42.5 G | Refills: 1 | Status: SHIPPED | OUTPATIENT
Start: 2024-06-10 | End: 2024-06-10 | Stop reason: SDUPTHER

## 2024-06-10 RX ORDER — ESTRADIOL 0.1 MG/G
0.5 CREAM VAGINAL 2 TIMES WEEKLY
Qty: 42.5 G | Refills: 1 | Status: SHIPPED | OUTPATIENT
Start: 2024-06-10

## 2024-09-04 ENCOUNTER — CONSULT (OUTPATIENT)
Dept: ENDOCRINOLOGY | Facility: CLINIC | Age: 64
End: 2024-09-04

## 2024-09-04 VITALS
HEIGHT: 62 IN | DIASTOLIC BLOOD PRESSURE: 88 MMHG | HEART RATE: 96 BPM | SYSTOLIC BLOOD PRESSURE: 138 MMHG | OXYGEN SATURATION: 98 % | BODY MASS INDEX: 35.59 KG/M2 | WEIGHT: 193.4 LBS

## 2024-09-04 DIAGNOSIS — E55.9 VITAMIN D DEFICIENCY: ICD-10-CM

## 2024-09-04 DIAGNOSIS — M81.0 OSTEOPOROSIS, UNSPECIFIED OSTEOPOROSIS TYPE, UNSPECIFIED PATHOLOGICAL FRACTURE PRESENCE: Primary | ICD-10-CM

## 2024-09-04 PROCEDURE — 99244 OFF/OP CNSLTJ NEW/EST MOD 40: CPT | Performed by: INTERNAL MEDICINE

## 2024-09-04 NOTE — ASSESSMENT & PLAN NOTE
History of osteoporosis for 15 years.  Currently on Prolia every 6 months-last injection June 1, 2024, provided by gynecology.  Prior therapies include Fosamax, raloxifene, and Prolia as well which was stopped for some years and then continued.  Good sources of dietary calcium.  Exercises regularly and working on adding strength exercises.  Labs from November 2023 calcium within normal limits at 9.9, TSH at 1.84, vitamin D at 52  DEXA scan from November 2023-showed osteopenia at left spine and left femoral neck, and compared to a study from 2021 she has increased bone density at the femoral neck, an increase of 7.7%.    Treatment options were discussed with the patient today which included continuing Prolia versus IV Reclast with a break on Prolia to minimize risk of adverse effects.  At this time, patient has decided to continue with Prolia therapy-we will start paperwork for Prolia  Labs to be done prior to next Prolia injection in December 2024 include BMP for calcium evaluation, PTH and vitamin D level  Continue with calcium supplementation, 1200 mg daily and vitamin D 1000 units daily  Discussed on adding strength exercises to her regimen  Next DEXA scan should be done around November 2025  Plan to follow-up in 9 months

## 2024-09-04 NOTE — ASSESSMENT & PLAN NOTE
Vitamin D deficiency  Supplements with vitamin D 1000 units daily    Check vitamin D level  If deficient, consider increased dosing of vitamin D with 50,000 units weekly  Results will be discussed with patient and further recommendations to follow

## 2024-09-04 NOTE — PROGRESS NOTES
Assessment and Plan:  1. Osteoporosis, unspecified osteoporosis type, unspecified pathological fracture presence  Assessment & Plan:  History of osteoporosis for 15 years.  Currently on Prolia every 6 months-last injection June 1, 2024, provided by gynecology.  Prior therapies include Fosamax, raloxifene, and Prolia as well which was stopped for some years and then continued.  Good sources of dietary calcium.  Exercises regularly and working on adding strength exercises.  Labs from November 2023 calcium within normal limits at 9.9, TSH at 1.84, vitamin D at 52  DEXA scan from November 2023-showed osteopenia at left spine and left femoral neck, and compared to a study from 2021 she has increased bone density at the femoral neck, an increase of 7.7%.    Treatment options were discussed with the patient today which included continuing Prolia versus IV Reclast with a break on Prolia to minimize risk of adverse effects.  At this time, patient has decided to continue with Prolia therapy-we will start paperwork for Prolia  Labs to be done prior to next Prolia injection in December 2024 include BMP for calcium evaluation, PTH and vitamin D level  Continue with calcium supplementation, 1200 mg daily and vitamin D 1000 units daily  Discussed on adding strength exercises to her regimen  Next DEXA scan should be done around November 2025  Plan to follow-up in 9 months   Orders:  -     Ambulatory Referral to Endocrinology  -     Basic metabolic panel; Future  -     PTH, intact; Future  2. Vitamin D deficiency  Assessment & Plan:  Vitamin D deficiency  Supplements with vitamin D 1000 units daily    Check vitamin D level  If deficient, consider increased dosing of vitamin D with 50,000 units weekly  Results will be discussed with patient and further recommendations to follow  Orders:  -     Vitamin D 25 hydroxy; Future     Nutrition Assessment and Intervention:     Reviewed food recall journal      Physical Activity Assessment and  Intervention:    Activity journal reviewed         HPI:   Ellyn Pak -  64 y.o. female with history of osteoporosis, that arrives to clinic for management of osteoporosis.     Osteoporosis diagnosed around 15 years ago.  Denies personal history of fractures.  Possible osteoporosis history in her mother.  Reports in the past being on Fosamax, then on raloxifene, then on Prolia which was stopped for a few years.  Patient did restart Prolia November 2021 and her last injection was in June 1, 2024 provided by her gynecologist.  Of note, patient's mother has history of endometrial and breast cancer, but patient does not have a history of these malignancies.  Patient underwent hysterectomy for history of thyroid at age 47 and did not make use of HRT.    Diet sources of calcium include veggies, yogurt, cheese, and occasional almond milk.  Patient is currently doing core strengthening exercises and is planning on adding full body strength exercises.  Supplements with calcium 600 mg daily and vitamin D 1000 units daily.  Last dental care visit was in July 2024.      Patient has no other complaints at this time.      Subjective:  Review of Systems   Constitutional:  Negative for appetite change, diaphoresis, fatigue and unexpected weight change.   Eyes:  Negative for visual disturbance.   Respiratory:  Negative for shortness of breath.    Gastrointestinal:  Negative for abdominal pain, diarrhea, nausea and vomiting.   Musculoskeletal:  Negative for back pain, gait problem and neck pain.   Neurological:  Negative for dizziness, weakness, light-headedness and headaches.        Patient Active Problem List   Diagnosis   • Osteoporosis   • Vitamin D deficiency        Current Outpatient Medications   Medication Sig Dispense Refill   • aspirin 81 MG tablet Take 81 mg by mouth     • B Complex Vitamins (B COMPLEX 1 PO)      • Calcium Ascorbate 500 MG TABS 1,000 mg     • denosumab (PROLIA) 60 mg/mL Inject 60 mg under the skin once  "    • estradiol (ESTRACE VAGINAL) 0.1 mg/g vaginal cream Insert 0.5 g into the vagina 2 (two) times a week 42.5 g 1   • levothyroxine 25 mcg tablet TAKE 1 TABLET BY MOUTH DAILY.     • mometasone (ELOCON) 0.1 % lotion Apply topically daily Apply to the canals 3 times per week 30 mL 6   • Multiple Vitamin (MULTIVITAMINS PO) Take by mouth     • Multiple Vitamins-Minerals (HAIR SKIN AND NAILS FORMULA PO) Take by mouth     • Omega-3 Fatty Acids (FISH OIL PO) Take 2 g by mouth     • Vitamin D-Vitamin K (VITAMIN K2-VITAMIN D3 PO) Take by mouth     • Cholecalciferol 50 MCG (2000 UT) CAPS Take 1 capsule by mouth (Patient not taking: Reported on 9/4/2024)     • ofloxacin (OCUFLOX) 0.3 % ophthalmic solution Administer 5 drops twice daily in affected ear for 7 days (Patient not taking: Reported on 9/4/2024) 5 mL 2     No current facility-administered medications for this visit.        No Known Allergies     The following portions of the patient's history were reviewed and updated as appropriate: allergies, current medications, past family history, past medical history, past social history, past surgical history and problem list.             Objective:  /88   Pulse 96   Ht 5' 2\" (1.575 m)   Wt 87.7 kg (193 lb 6.4 oz)   SpO2 98%   BMI 35.37 kg/m²      Body mass index is 35.37 kg/m².     Physical Exam  Vitals reviewed.   Constitutional:       Appearance: Normal appearance. She is obese. She is not ill-appearing or diaphoretic.   HENT:      Head: Normocephalic and atraumatic.   Eyes:      General: No scleral icterus.     Conjunctiva/sclera: Conjunctivae normal.   Cardiovascular:      Rate and Rhythm: Normal rate and regular rhythm.      Pulses: Normal pulses.      Heart sounds: Normal heart sounds. No murmur heard.     No gallop.   Pulmonary:      Effort: Pulmonary effort is normal. No respiratory distress.      Breath sounds: Normal breath sounds. No wheezing or rales.   Musculoskeletal:         General: Normal range of " motion.      Cervical back: Normal range of motion and neck supple.      Right lower leg: No edema.      Left lower leg: No edema.   Skin:     General: Skin is warm and dry.      Coloration: Skin is not jaundiced or pale.   Neurological:      Mental Status: She is alert and oriented to person, place, and time. Mental status is at baseline.   Psychiatric:         Mood and Affect: Mood normal.         Behavior: Behavior normal.          Labs:  I have personally reviewed the following labs.   Latest Reference Range & Units 11/09/22 11:54 11/20/23 11:51   Sodium 135 - 145 mmol/L 139 (E) 140 (E)   Potassium 3.5 - 5.2 mmol/L 4.4 (E) 4.5 (E)   Chloride 100 - 109 mmol/L 105 (E) 104 (E)   Carbon Dioxide 21 - 31 mmol/L 28 (E) 26 (E)   ANION GAP 3 - 11  6 (E) 10 (E)   BUN 7 - 25 mg/dL 17 (E) 17 (E)   Creatinine 0.40 - 1.10 mg/dL 0.64 (E) 0.64 (E)   GLUCOSE 65 - 99 mg/dL 110 (H) (E) 99 (E)   Calcium 8.5 - 10.1 mg/dL 9.5 (E) 9.9 (E)   AST <41 U/L 12 (E) 16 (E)   ALT <56 U/L 31 (E) 23 (E)   ALK PHOS 35 - 120 U/L 41 (E) 31 (L) (E)   Total Protein 6.3 - 8.3 g/dL 7.2 (E) 6.6 (E)   Albumin 3.5 - 5.7 g/dL 4.0 (E) 4.6 (E)   Total Bilirubin 0.2 - 1.0 mg/dL 0.6 (E) 0.7 (E)   GFR, Calculated >59  100 (E) 99 (E)   Hemoglobin A1C <5.7 % 6.0 (H) (E) 5.7 (H) (E)   eAG, EST AVG Glucose mg/dL 126 (E) 117 (E)   TSH, POC 0.45 - 5.33 uIU/mL 1.91 (E) 1.84 (E)   (H): Data is abnormally high  (L): Data is abnormally low  (E): External lab result     Imaging:  I have personally reviewed the following imaging.    DEXA scan   Nawaf Barger MD - 11/20/2023  Formatting of this note might be different from the original.  Clinical history postmenopausal. On Prolia. Hologic DEXA bone densitometry of  the lumbar spine was performed. L1 was excluded. Average bone density from L2 to  L4 measured 0.853 g/ cm2. This corresponds to 79 percent of peak bone mass.  Findings are 2.1 standard deviations below the mean for peak bone mass.    Bone densitometry of the left  hip was performed. Total bone density of the left  hip measured 0.911 g/ cm2. This corresponds to 97 percent of peak bone  mass.  Findings are 0.3 standard deviations below the mean for peak bone mass. In the  left femoral neck,    bone density measures 0.647  g/cm2, corresponding to  76    percent of peak bone mass and 1.8 standard deviations below the mean for peak  bone mass. Compared to previous examination, 8/23/2021, performed on dissimilar  scan type, bone density in the femoral neck is increased 7.7%.    IMPRESSION:  Impression: Osteopenia of the lumbar spine and left femoral neck. Currently  there is mild increased fracture risk.    WHO Fracture Risk Assessment Tool (FRAX) not performed because the patient is  being treated for osteoporosis.       Thomas Gil MD  Endocrinology Fellow, PGY-4

## 2024-09-11 ENCOUNTER — TELEPHONE (OUTPATIENT)
Age: 64
End: 2024-09-11

## 2024-09-11 ENCOUNTER — TELEPHONE (OUTPATIENT)
Dept: ENDOCRINOLOGY | Facility: CLINIC | Age: 64
End: 2024-09-11

## 2024-09-11 NOTE — TELEPHONE ENCOUNTER
Patient called and said someone called her about her insurance being inactive, while they were trying to approve her prolia. Her insurance is active but never covered the visit here or the prolia that she has been getting. She said that the prolia and the visit here last week were not covered by her insurance and that she has always paid out of pocket for the prolia and visit.   She would like to know if she can schedule the prolia injection on December 4, 2024. They are going on vacation on 12-5-24 and she had her last prolia injection on Marilyn 3, 2024.  I am not sure how to do this since she is paying out of pocket for the prolia.     Please advise.

## 2024-09-11 NOTE — TELEPHONE ENCOUNTER
Phone call from patient - received message regarding Prolia Shot (unable to find documentation). Transferred patient to [facility] for further assistance.

## 2024-09-18 ENCOUNTER — TELEPHONE (OUTPATIENT)
Dept: ENDOCRINOLOGY | Facility: CLINIC | Age: 64
End: 2024-09-18

## 2025-01-16 ENCOUNTER — TELEPHONE (OUTPATIENT)
Age: 65
End: 2025-01-16

## 2025-01-16 NOTE — TELEPHONE ENCOUNTER
Patient called stating she still wants the prolia shot and will pay out of pocket for it at her visit. (More info can be found on 9/18/24 telephone encounter.)

## 2025-01-20 ENCOUNTER — CLINICAL SUPPORT (OUTPATIENT)
Dept: ENDOCRINOLOGY | Facility: CLINIC | Age: 65
End: 2025-01-20

## 2025-01-20 DIAGNOSIS — M81.0 AGE RELATED OSTEOPOROSIS, UNSPECIFIED PATHOLOGICAL FRACTURE PRESENCE: Primary | ICD-10-CM

## 2025-01-20 PROCEDURE — 96372 THER/PROPH/DIAG INJ SC/IM: CPT | Performed by: INTERNAL MEDICINE

## 2025-01-20 NOTE — PROGRESS NOTES
Assessment/Plan:    Ellyn Pak came into the [facility] Endocr[facility]ology Office today 01/20/25 to receive prolia injection.      Verbal consent obtained.  Consent given by: patient    patient states patient has been medically healthy with no underlining concerns/complications.      Ellyn Pak presents with no symptoms today.       All insturctions were reviewed with the patient.    If the patient should have any questions/concerns, advised patient to contacted [facility].       Subjective:     History provided by: patient    Patient ID: Ellyn Pak is a 64 y.o. female      Objective:    There were no vitals filed for this visit.    Patient tolerated the injection well without any complications.  Injection site/s left arm.  Medication was provided by office.    Patient signed consent form yes   Patient signed ABN form yes (If no patient is not a medicare patient).   Patient waited 15 minutes after injection no (This only applies to patient's receiving first time injection).       Last Visit: 9/18/2024  Next visit:6/9/2025

## 2025-06-18 NOTE — PROGRESS NOTES
Name: Ellyn Pak      : 1960      MRN: [id number]  Encounter Provider: ADELINA Pardo  Encounter Date: 2025   Encounter department: [facility]  :  Assessment & Plan  Encounter for gynecological examination (general) (routine) without abnormal findings  Will continue with breast care team recommendations.  ASCCP guidelines reviewed and pap with cotesting noted to be up to date; this low risk patient was advised she meets criteria to d/c pap screening at age 66. Pap done at pt request. DEXA and colonoscopy noted to be up to date. Reviewed diet/activity recommendations Calcium 1200 mg and Vit D 600-1000 IU daily.  Discussed postmenopausal considerations and symptoms to report. Kegel exercises as instructed. RTO in one year for routine annual gyn exam or sooner PRN.               History of Present Illness   This patient presents for routine annual gyn exam.  Has a hx of LSH/BSO for fibroids.  She follows with breast specialist at [facility] for hx of intraductal papilloma of right breast. Mammo benign 24.  Her family history is positive for breast cancer in her mother who was diagnosed at the age of 53, then she was diagnosed with endometrial cancer at age 56, colon cancer at age 60 and lymphoma between the ages of 60-70. Per the patient her mother tested negative for the BRCA and colon mutations. Two maternal uncles along with both of her grandfathers had prostate cancer. The patient's [employer]ae testing is negative.  She is on Prolia for osteoporosis. Will continue with endocrinology.    She denies vaginal bleeding or spotting, VM sx, pelvic pain, dyspareunia, breast concerns, abnormal discharge, bowel/bladder dysfunction, depression/anx.    38 years, rarely sexually active. Pt has vaginal estrogen prescribed, but not using it consistently.  Pap normal, 6/3/24. Osteoporosis screening up to date, 23, osteopenia. Colonoscopy 23 per pt  "            Review of Systems   Constitutional: Negative.    Respiratory: Negative.     Cardiovascular: Negative.    Gastrointestinal: Negative.    Endocrine: Negative.    Genitourinary:  Negative for dysuria, frequency, pelvic pain, urgency, vaginal bleeding, vaginal discharge and vaginal pain.   Musculoskeletal: Negative.    Skin: Negative.    Neurological: Negative.    Psychiatric/Behavioral: Negative.            Objective   /90 (BP Location: Right arm, Patient Position: Sitting, Cuff Size: Standard)   Ht 5' 2\" (1.575 m)   Wt 87.5 kg (193 lb)   BMI 35.30 kg/m²      Physical Exam  Vitals and nursing note reviewed. Exam conducted with a chaperone present.   Constitutional:       General: She is not in acute distress.     Appearance: She is well-developed.   HENT:      Head: Normocephalic and atraumatic.     Eyes:      Conjunctiva/sclera: Conjunctivae normal.       Cardiovascular:      Rate and Rhythm: Normal rate and regular rhythm.      Heart sounds: No murmur heard.  Pulmonary:      Effort: Pulmonary effort is normal. No respiratory distress.      Breath sounds: Normal breath sounds.   Abdominal:      Palpations: Abdomen is soft.      Tenderness: There is no abdominal tenderness.   Genitourinary:     General: Normal vulva.      Exam position: Supine.      Pubic Area: No rash.       Labia:         Right: No rash, tenderness, lesion or injury.         Left: No rash, tenderness, lesion or injury.       Urethra: No prolapse, urethral pain, urethral swelling or urethral lesion.      Vagina: No signs of injury and foreign body. No vaginal discharge, erythema, tenderness, bleeding, lesions or prolapsed vaginal walls.      Cervix: No cervical motion tenderness, discharge, friability, lesion, erythema, cervical bleeding or eversion.      Adnexa:         Right: No mass, tenderness or fullness.          Left: No mass, tenderness or fullness.        Comments: Uterus absent, no masses or tenderness on BME  Exam " limited by body habitus    Musculoskeletal:         General: No swelling.      Cervical back: Neck supple.     Skin:     General: Skin is warm and dry.      Capillary Refill: Capillary refill takes less than 2 seconds.     Neurological:      Mental Status: She is alert.     Psychiatric:         Mood and Affect: Mood normal.

## 2025-06-20 ENCOUNTER — ANNUAL EXAM (OUTPATIENT)
Dept: GYNECOLOGY | Facility: CLINIC | Age: 65
End: 2025-06-20
Payer: COMMERCIAL

## 2025-06-20 VITALS
BODY MASS INDEX: 35.51 KG/M2 | SYSTOLIC BLOOD PRESSURE: 148 MMHG | HEIGHT: 62 IN | WEIGHT: 193 LBS | DIASTOLIC BLOOD PRESSURE: 90 MMHG

## 2025-06-20 DIAGNOSIS — Z01.419 ENCOUNTER FOR GYNECOLOGICAL EXAMINATION (GENERAL) (ROUTINE) WITHOUT ABNORMAL FINDINGS: Primary | ICD-10-CM

## 2025-06-20 DIAGNOSIS — Z12.4 ENCOUNTER FOR PAPANICOLAOU SMEAR FOR CERVICAL CANCER SCREENING: ICD-10-CM

## 2025-06-20 PROCEDURE — G0145 SCR C/V CYTO,THINLAYER,RESCR: HCPCS | Performed by: OBSTETRICS & GYNECOLOGY

## 2025-06-20 PROCEDURE — 99396 PREV VISIT EST AGE 40-64: CPT | Performed by: OBSTETRICS & GYNECOLOGY

## 2025-06-20 PROCEDURE — G0476 HPV COMBO ASSAY CA SCREEN: HCPCS | Performed by: OBSTETRICS & GYNECOLOGY

## 2025-06-25 LAB
LAB AP GYN PRIMARY INTERPRETATION: NORMAL
Lab: NORMAL

## 2025-07-22 ENCOUNTER — OFFICE VISIT (OUTPATIENT)
Dept: ENDOCRINOLOGY | Facility: CLINIC | Age: 65
End: 2025-07-22
Payer: COMMERCIAL

## 2025-07-22 VITALS
HEART RATE: 105 BPM | BODY MASS INDEX: 34.78 KG/M2 | OXYGEN SATURATION: 96 % | HEIGHT: 62 IN | DIASTOLIC BLOOD PRESSURE: 88 MMHG | SYSTOLIC BLOOD PRESSURE: 142 MMHG | WEIGHT: 189 LBS

## 2025-07-22 DIAGNOSIS — M81.0 AGE RELATED OSTEOPOROSIS, UNSPECIFIED PATHOLOGICAL FRACTURE PRESENCE: Primary | ICD-10-CM

## 2025-07-22 DIAGNOSIS — E55.9 VITAMIN D DEFICIENCY: ICD-10-CM

## 2025-07-22 PROCEDURE — 96372 THER/PROPH/DIAG INJ SC/IM: CPT | Performed by: INTERNAL MEDICINE

## 2025-07-22 PROCEDURE — 99214 OFFICE O/P EST MOD 30 MIN: CPT | Performed by: INTERNAL MEDICINE

## 2025-07-22 NOTE — PROGRESS NOTES
"Chief Complaint   Patient presents with   • Osteoporosis      Referring Provider  No referring provider defined for this encounter.     History of Present Illness:   Ellyn Pak is a 65 y.o. female with osteoporosis seen in follow-up. Last in the office in Sept 2024    She has hx of osteoporosis on DXA with improvement over several years. There is a history of alendronate (at least reference in 2013 note). She took raloxifene for 4-5years until ~2016. She then took Prolia for a few years and this was stopped ~2019. It was resumed in 2021 and she has received it regualrly.     Did fall on vacation but no fracture.     Menopause ~age 47y.   No fractures history or height loss. No steroids or AEDs. Ambulating without issues.        Dietary Calcium intake:some yogurt and cheese. Also eats leafy greens  Calcium/Vitamin D supplementation: additional 500mg daily  Weight bearing exercises: walks dogs a mile daily 15-20mins       Problem List[1]   Past Medical History[1]   Past Surgical History[1]   Family History[1]  Social History     Tobacco Use   • Smoking status: Never   • Smokeless tobacco: Never   Substance Use Topics   • Alcohol use: Yes     Alcohol/week: 2.0 - 3.0 standard drinks of alcohol     Types: 2 - 3 Glasses of wine per week     Comment: weekends 1-2 glasses of wine     Allergies[1]    Current Medications[1]  Review of Systems   Constitutional:  Negative for unexpected weight change.   HENT:  Negative for hearing loss.    Musculoskeletal:  Negative for gait problem.   Neurological:  Negative for tremors.   Psychiatric/Behavioral:  The patient is not nervous/anxious.        Physical Exam:  Body mass index is 34.57 kg/m².  /88   Pulse 105   Ht 5' 2\" (1.575 m)   Wt 85.7 kg (189 lb)   SpO2 96%   BMI 34.57 kg/m²    Wt Readings from Last 3 Encounters:   07/22/25 85.7 kg (189 lb)   06/20/25 87.5 kg (193 lb)   05/23/25 87.5 kg (193 lb)         Physical Exam   Gen: appears well-developed and " "well-nourished. No apparent distress.   Head: Normocephalic and atraumatic.   Eyes: no stare or proptosis, no periorbital edema  E/N/M nl facies, hearing grossly intact  Neck: range of motion.   Pulmonary/Chest: breathing  comfortably, no accessory muscle use, effort normal.   Musculoskeletal: moves upper extremities  Neurological: alert and oriented to person, place, and time. No upper ext tremor appreciated  Skin: does not appear diaphoretic, no facial plethora  Psychiatric: normal mood and affect; behavior is normal; no gross lapses in memory, answer questions appropriately      DATA:  Labs:       Lab Results   Component Value Date    TSH 1.72 11/26/2024     Lab Results   Component Value Date    SODIUM 140 11/26/2024    K 4 11/26/2024     11/26/2024    CO2 29 11/26/2024    BUN 16 11/26/2024    CREATININE 0.69 11/26/2024    GLUC 111 (H) 11/26/2024    CALCIUM 9.1 11/26/2024      Lab Results   Component Value Date    CALCIUM 9.1 11/26/2024      No results found for: \"LABPROT\"   25-oh D      Radiology    08/23/2021       Impression/Plan:  Problem List Items Addressed This Visit     Osteoporosis - Primary    Doing well. Will get Prolia today. DXA due in Nov 2025         Relevant Medications    denosumab (PROLIA) subcutaneous injection 60 mg (Start on 7/22/2025  3:00 PM)    Other Relevant Orders    DXA bone density spine hip and pelvis    Vitamin D deficiency    Will check vit D level with next labs.          Relevant Orders    Vitamin D 25 hydroxy               Discussed with the patient and all questioned fully answered. She will call me if any problems arise.        Romi Jenkins MD                           [1]  Patient Active Problem List  Diagnosis   • Osteoporosis   • Vitamin D deficiency   [1]  Past Medical History:  Diagnosis Date   • BRCA negative    • Dental disease    • Disease of thyroid gland About 12 years ago   • Fibroid 34 years ago   • Menopause ovarian failure For the past 13 years   • " Miscarriage    • Osteoporosis Several years   [1]  Past Surgical History:  Procedure Laterality Date   • BREAST BIOPSY     • BREAST SURGERY      biopsy   • COLONOSCOPY  2018   • COMBINED HYSTEROSCOPY DIAGNOSTIC / D&C     • ENDOMETRIAL ABLATION     • HYSTERECTOMY  13 years ago   • INDUCED       surgical   • LAPAROSCOPIC SALPINGOOPHERECTOMY      bilateral   • LAPAROSCOPIC SUPRACERVICAL HYSTERECTOMY     • TUBAL LIGATION     [1]  Family History  Problem Relation Name Age of Onset   • Breast cancer Mother Mom    • Colon cancer Mother Mom    • Uterine cancer Mother Mom         endometrial   • Lymphoma Mother Mom    • BRCA1 Negative Mother Mom    • BRCA2 Negative Mother Mom    • Osteoporosis Mother Mom    • Cancer Father Landon         Bladder   • Lung cancer Father Landon    • Hyperlipidemia Father Landon    • Hypertension Father Landon    • Prostate cancer Brother Rosy         prostate removed   • Cancer Brother Rosy    • Colon cancer Maternal Grandmother Payal    [1]  No Known Allergies[1]    Current Outpatient Medications:   •  aspirin 81 MG tablet, Take 81 mg by mouth, Disp: , Rfl:   •  B Complex Vitamins (B COMPLEX 1 PO), , Disp: , Rfl:   •  Calcium Ascorbate 500 MG TABS, 1,000 mg, Disp: , Rfl:   •  denosumab (PROLIA) 60 mg/mL, Inject 60 mg under the skin once, Disp: , Rfl:   •  estradiol (ESTRACE VAGINAL) 0.1 mg/g vaginal cream, Insert 0.5 g into the vagina 2 (two) times a week, Disp: 42.5 g, Rfl: 1  •  levothyroxine 25 mcg tablet, , Disp: , Rfl:   •  mometasone (ELOCON) 0.1 % lotion, Apply topically daily Apply to the canals 3 times per week, Disp: 30 mL, Rfl: 6  •  Multiple Vitamin (MULTIVITAMINS PO), Take by mouth, Disp: , Rfl:   •  Multiple Vitamins-Minerals (HAIR SKIN AND NAILS FORMULA PO), Take by mouth, Disp: , Rfl:   •  Omega-3 Fatty Acids (FISH OIL PO), Take 2 g by mouth, Disp: , Rfl:   •  Vitamin D-Vitamin K (VITAMIN K2-VITAMIN D3 PO), Take by mouth, Disp: , Rfl:   •  Cholecalciferol 50 MCG (  UT) CAPS, Take 1 capsule by mouth (Patient not taking: Reported on 9/4/2024), Disp: , Rfl:   •  ofloxacin (OCUFLOX) 0.3 % ophthalmic solution, Administer 5 drops twice daily in affected ear for 7 days (Patient not taking: Reported on 9/4/2024), Disp: 5 mL, Rfl: 2    Current Facility-Administered Medications:   •  denosumab (PROLIA) subcutaneous injection 60 mg, 60 mg, Subcutaneous, Q6 Months,

## 2025-07-22 NOTE — PROGRESS NOTES
"Assessment/Plan:    Ellyn Pak came into the [facility] Office today 07/22/25 to receive prolia injection.      Verbal consent obtained.  Consent given by: patient    patient states patient has been medically healthy with no underlining concerns/complications.      Ellyn Pak presents with no symptoms today.       All insturctions were reviewed with the patient.    If the patient should have any questions/concerns, advised patient to contacted [facility].       Subjective:     History provided by: patient    Patient ID: Ellyn Pak is a 65 y.o. female      Objective:    Vitals:    07/22/25 1357   BP: 142/88   Pulse: 105   SpO2: 96%   Weight: 85.7 kg (189 lb)   Height: 5' 2\" (1.575 m)       Patient tolerated the injection well without any complications.  Injection site/s right arm.  Medication was provided by office.    Patient signed consent form yes   Patient signed ABN form yes (If no patient is not a medicare patient).   Patient waited 15 minutes after injection no (This only applies to patient's receiving first time injection).       Last Visit: Visit date not found  Next visit:Visit date not found     "

## 2025-07-22 NOTE — ASSESSMENT & PLAN NOTE
Doing well. Will get Prolia today. DXA due in Nov 2025   S: see above; MARCO ANTONIO/LV noted above. Tech note reviewed and accepted  Chief Complaint/History of Present Illness:     --GS pt**  TH since '18     OU DRY OFF/ON  --AT help     vision stable since last eye exam/eval  No (other) associated signs/symptoms   No other new visual complaints.    Current need(s) for glasses reviewed: pg 4/17, GS    Use of eye gtts reviewed:   --AT 1-2X/D PRN    MEDS verified   PAST MEDICAL HISTORY/PROBLEMS/PAST SURGICAL HISTORY/FAMILY HISTORY/SOCIAL HISTORY: reviewed   POH: see above   Remv cataract extracap insert lens Right 02/20/2014    IOL right eye ZCBOO 22.0 Dr. Marcum   • Remv cataract extracap insert lens Left 03/06/2014    left GPS ZCBOO 21.5       Review of Systems:  1) see HPI(S)/POH for ocular  ROS  2) denies recent/progressive HA's  3) general health fairly stable, feeling fine    SLE :       L/L/L:  OD: 1+ decr TF //OS: 1+ decr TF       CONJ:  OD: clear  //OS: clear          CORNEA:  OD: clear //OS: clear       AC:  OD: d,q  // OS: d,q       IRIS:  OD: clear  //OS: clear       LENS:  OD: PC IOL centered and trace pco   //OS: PC IOL centered and trace pco     FUNDUS: C/D  0.4 /0.4           vitr clear with nl ON/V/P          mac: OD: mild rpe mottl                    OS: mild rpe mottl           no break/RD    EXT EXAM:  normal  NPs: Alert and oriented x 3, no apparent distress    A:  see diagnosis below; tech note reviewed and accepted     P:  see orders/disposition    COMMENT: conditions stable, observation reccommended, pt concerned...reassured and questions answered  NO Rx for glasses given.     Recommend cont/INCR artificial tears prn (Refresh, Genteal, other)    Follow up with Optometry : [facility] in past  --me prn